# Patient Record
Sex: FEMALE | URBAN - NONMETROPOLITAN AREA
[De-identification: names, ages, dates, MRNs, and addresses within clinical notes are randomized per-mention and may not be internally consistent; named-entity substitution may affect disease eponyms.]

---

## 2018-05-14 ENCOUNTER — TELEPHONE (OUTPATIENT)
Dept: FAMILY MEDICINE CLINIC | Facility: CLINIC | Age: 33
End: 2018-05-14

## 2018-05-14 NOTE — TELEPHONE ENCOUNTER
PATIENT HAS NOT BEEN SEEN SINCE 2007. SHE IS ASKING IF DR Roberto Massey REMEMBERS HER WHEN SHE WAS ABOUT 8 YRS OLD AND CAME IN FOR HER FIRST VISIT AND HE RECOMMENDED MOTHER TO HAVE HER IMMUNIZED AND MOTHER REFUSED.   DR Roberto Massey ALSO RECOMMENDED SHE HAVE EAR TUBES